# Patient Record
Sex: MALE | Race: WHITE | NOT HISPANIC OR LATINO | Employment: FULL TIME | URBAN - METROPOLITAN AREA
[De-identification: names, ages, dates, MRNs, and addresses within clinical notes are randomized per-mention and may not be internally consistent; named-entity substitution may affect disease eponyms.]

---

## 2023-06-07 ENCOUNTER — APPOINTMENT (EMERGENCY)
Dept: RADIOLOGY | Facility: HOSPITAL | Age: 18
End: 2023-06-07
Payer: COMMERCIAL

## 2023-06-07 ENCOUNTER — HOSPITAL ENCOUNTER (EMERGENCY)
Facility: HOSPITAL | Age: 18
Discharge: HOME | End: 2023-06-07
Attending: EMERGENCY MEDICINE
Payer: COMMERCIAL

## 2023-06-07 VITALS
WEIGHT: 140.65 LBS | TEMPERATURE: 98.9 F | OXYGEN SATURATION: 100 % | BODY MASS INDEX: 20.83 KG/M2 | DIASTOLIC BLOOD PRESSURE: 93 MMHG | HEART RATE: 70 BPM | RESPIRATION RATE: 15 BRPM | SYSTOLIC BLOOD PRESSURE: 116 MMHG | HEIGHT: 69 IN

## 2023-06-07 DIAGNOSIS — R06.00 DYSPNEA: Primary | ICD-10-CM

## 2023-06-07 LAB
ALBUMIN SERPL BCP-MCNC: 4.6 G/DL (ref 3.5–5)
ALP SERPL-CCNC: 82 U/L (ref 34–104)
ALT SERPL W P-5'-P-CCNC: 15 U/L (ref 7–52)
ANION GAP SERPL CALCULATED.3IONS-SCNC: 6 MMOL/L (ref 4–13)
AST SERPL W P-5'-P-CCNC: 23 U/L (ref 13–39)
BASOPHILS # BLD AUTO: 0.08 THOUSANDS/ÂΜL (ref 0–0.1)
BASOPHILS NFR BLD AUTO: 1 % (ref 0–1)
BILIRUB SERPL-MCNC: 1.07 MG/DL (ref 0.2–1)
BUN SERPL-MCNC: 20 MG/DL (ref 5–25)
CALCIUM SERPL-MCNC: 9.4 MG/DL (ref 8.4–10.2)
CARDIAC TROPONIN I PNL SERPL HS: <2 NG/L
CHLORIDE SERPL-SCNC: 103 MMOL/L (ref 96–108)
CO2 SERPL-SCNC: 27 MMOL/L (ref 21–32)
CREAT SERPL-MCNC: 1.31 MG/DL (ref 0.6–1.3)
EOSINOPHIL # BLD AUTO: 0.12 THOUSAND/ÂΜL (ref 0–0.61)
EOSINOPHIL NFR BLD AUTO: 1 % (ref 0–6)
ERYTHROCYTE [DISTWIDTH] IN BLOOD BY AUTOMATED COUNT: 12.3 % (ref 11.6–15.1)
GFR SERPL CREATININE-BSD FRML MDRD: 77 ML/MIN/1.73SQ M
GLUCOSE SERPL-MCNC: 159 MG/DL (ref 65–140)
HCT VFR BLD AUTO: 46.2 % (ref 36.5–49.3)
HGB BLD-MCNC: 15.9 G/DL (ref 12–17)
IMM GRANULOCYTES # BLD AUTO: 0.02 THOUSAND/UL (ref 0–0.2)
IMM GRANULOCYTES NFR BLD AUTO: 0 % (ref 0–2)
LYMPHOCYTES # BLD AUTO: 0.69 THOUSANDS/ÂΜL (ref 0.6–4.47)
LYMPHOCYTES NFR BLD AUTO: 7 % (ref 14–44)
MCH RBC QN AUTO: 28.8 PG (ref 26.8–34.3)
MCHC RBC AUTO-ENTMCNC: 34.4 G/DL (ref 31.4–37.4)
MCV RBC AUTO: 84 FL (ref 82–98)
MONOCYTES # BLD AUTO: 0.49 THOUSAND/ÂΜL (ref 0.17–1.22)
MONOCYTES NFR BLD AUTO: 5 % (ref 4–12)
NEUTROPHILS # BLD AUTO: 7.95 THOUSANDS/ÂΜL (ref 1.85–7.62)
NEUTS SEG NFR BLD AUTO: 86 % (ref 43–75)
NRBC BLD AUTO-RTO: 0 /100 WBCS
PLATELET # BLD AUTO: 273 THOUSANDS/UL (ref 149–390)
PMV BLD AUTO: 10 FL (ref 8.9–12.7)
POTASSIUM SERPL-SCNC: 3.3 MMOL/L (ref 3.5–5.3)
PROT SERPL-MCNC: 7.4 G/DL (ref 6.4–8.4)
RBC # BLD AUTO: 5.53 MILLION/UL (ref 3.88–5.62)
SODIUM SERPL-SCNC: 136 MMOL/L (ref 135–147)
WBC # BLD AUTO: 9.35 THOUSAND/UL (ref 4.31–10.16)

## 2023-06-07 PROCEDURE — 93005 ELECTROCARDIOGRAM TRACING: CPT

## 2023-06-07 PROCEDURE — 71045 X-RAY EXAM CHEST 1 VIEW: CPT

## 2023-06-07 PROCEDURE — 99283 EMERGENCY DEPT VISIT LOW MDM: CPT

## 2023-06-07 PROCEDURE — 85025 COMPLETE CBC W/AUTO DIFF WBC: CPT | Performed by: EMERGENCY MEDICINE

## 2023-06-07 PROCEDURE — 84484 ASSAY OF TROPONIN QUANT: CPT | Performed by: EMERGENCY MEDICINE

## 2023-06-07 PROCEDURE — 36415 COLL VENOUS BLD VENIPUNCTURE: CPT | Performed by: EMERGENCY MEDICINE

## 2023-06-07 PROCEDURE — 99284 EMERGENCY DEPT VISIT MOD MDM: CPT | Performed by: EMERGENCY MEDICINE

## 2023-06-07 PROCEDURE — 80053 COMPREHEN METABOLIC PANEL: CPT | Performed by: EMERGENCY MEDICINE

## 2023-06-07 RX ORDER — POTASSIUM CHLORIDE 20 MEQ/1
20 TABLET, EXTENDED RELEASE ORAL ONCE
Status: COMPLETED | OUTPATIENT
Start: 2023-06-07 | End: 2023-06-07

## 2023-06-07 RX ADMIN — POTASSIUM CHLORIDE 20 MEQ: 1500 TABLET, EXTENDED RELEASE ORAL at 21:06

## 2023-06-07 NOTE — Clinical Note
Erica Szymanski was seen and treated in our emergency department on 6/7/2023  Diagnosis:     Susan Coroks  may return to work on return date  He may return on this date: 06/09/2023         If you have any questions or concerns, please don't hesitate to call        Margot Saavedra MD    ______________________________           _______________          _______________  Hospital Representative                              Date                                Time

## 2023-06-08 LAB
ATRIAL RATE: 93 BPM
P AXIS: 75 DEGREES
PR INTERVAL: 136 MS
QRS AXIS: 104 DEGREES
QRSD INTERVAL: 98 MS
QT INTERVAL: 354 MS
QTC INTERVAL: 421 MS
T WAVE AXIS: 75 DEGREES
VENTRICULAR RATE: 85 BPM

## 2023-06-08 PROCEDURE — 93010 ELECTROCARDIOGRAM REPORT: CPT | Performed by: INTERNAL MEDICINE

## 2023-06-08 NOTE — DISCHARGE INSTRUCTIONS
You were seen in the emergency department today for shortness of breath  This is most likely related to hyperventilation syndrome which can be brought about by stress or anxiety  Screening test done here today did not suggest any problems with your heart, lungs, or biochemistries  Your potassium was somewhat low and we have given you some potassium here

## 2023-07-03 NOTE — ED PROVIDER NOTES
History  Chief Complaint   Patient presents with   • Panic Attack     Pt came by EMS c/o a panic attack that happened while he was stopped at [employer] on his way to work. Pt states his hands cramped up and SOB. Pt denies Hx of this. 1year-old male presenting to the emergency department for evaluation of muscle cramps. Patient called EMS when he got very anxious. Started to hyperventilate, then had cramping of his bilateral hands. States that he has generalized anxiety over his work and health. Symptoms have somewhat resolved but he still mildly anxious. He denies any HI or SI. Has some chest tightness at the time. None       No past medical history on file. No past surgical history on file. No family history on file. I have reviewed and agree with the history as documented. No existing history information found. No existing history information found. Review of Systems   Constitutional: Negative for appetite change, chills, fatigue and fever. HENT: Negative for sneezing and sore throat. Eyes: Negative for visual disturbance. Respiratory: Negative for cough, choking, chest tightness, shortness of breath and wheezing. Cardiovascular: Positive for chest pain. Negative for palpitations. Gastrointestinal: Negative for abdominal pain, constipation, diarrhea, nausea and vomiting. Genitourinary: Negative for difficulty urinating and dysuria. Neurological: Negative for dizziness, weakness, light-headedness, numbness and headaches. Psychiatric/Behavioral: The patient is nervous/anxious. All other systems reviewed and are negative. Physical Exam  Physical Exam  Vitals and nursing note reviewed. Constitutional:       General: He is not in acute distress. Appearance: He is well-developed. He is not diaphoretic. HENT:      Head: Normocephalic and atraumatic. Eyes:      Pupils: Pupils are equal, round, and reactive to light. Neck:      Vascular: No JVD. Trachea: No tracheal deviation. Cardiovascular:      Rate and Rhythm: Normal rate and regular rhythm. Heart sounds: Normal heart sounds. No murmur heard. No friction rub. No gallop. Pulmonary:      Effort: Pulmonary effort is normal. No respiratory distress. Breath sounds: Normal breath sounds. No wheezing or rales. Abdominal:      General: Bowel sounds are normal. There is no distension. Palpations: Abdomen is soft. Tenderness: There is no abdominal tenderness. There is no guarding or rebound. Skin:     General: Skin is warm and dry. Coloration: Skin is not pale. Neurological:      Mental Status: He is alert and oriented to person, place, and time. Cranial Nerves: No cranial nerve deficit. Motor: No abnormal muscle tone.    Psychiatric:         Behavior: Behavior normal.         Vital Signs  ED Triage Vitals [06/07/23 1804]   Temperature Pulse Respirations Blood Pressure SpO2   98.9 °F (37.2 °C) 84 16 116/93 98 %      Temp Source Heart Rate Source Patient Position - Orthostatic VS BP Location FiO2 (%)   Oral Monitor Sitting Right arm --      Pain Score       --           Vitals:    06/07/23 1804 06/07/23 1809 06/07/23 2059   BP: 116/93 116/93    Pulse: 84 91 70   Patient Position - Orthostatic VS: Sitting           Visual Acuity      ED Medications  Medications   potassium chloride (K-DUR,KLOR-CON) CR tablet 20 mEq (20 mEq Oral Given 6/7/23 2106)       Diagnostic Studies  Results Reviewed     Procedure Component Value Units Date/Time    HS Troponin 0hr (reflex protocol) [[id number]]  (Normal) Collected: 06/07/23 1903    Lab Status: Final result Specimen: Blood from Arm, Right Updated: 06/07/23 1951     hs TnI 0hr <2 ng/L     Comprehensive metabolic panel [[id number]]  (Abnormal) Collected: 06/07/23 1903    Lab Status: Final result Specimen: Blood from Arm, Right Updated: 06/07/23 1944     Sodium 136 mmol/L      Potassium 3.3 mmol/L      Chloride 103 mmol/L      CO2 27 mmol/L      ANION GAP 6 mmol/L      BUN 20 mg/dL      Creatinine 1.31 mg/dL      Glucose 159 mg/dL      Calcium 9.4 mg/dL      AST 23 U/L      ALT 15 U/L      Alkaline Phosphatase 82 U/L      Total Protein 7.4 g/dL      Albumin 4.6 g/dL      Total Bilirubin 1.07 mg/dL      eGFR 77 ml/min/1.73sq m     Narrative:      [facility] guidelines for Chronic Kidney Disease (CKD):   •  Stage 1 with normal or high GFR (GFR > 90 mL/min/1.73 square meters)  •  Stage 2 Mild CKD (GFR = 60-89 mL/min/1.73 square meters)  •  Stage 3A Moderate CKD (GFR = 45-59 mL/min/1.73 square meters)  •  Stage 3B Moderate CKD (GFR = 30-44 mL/min/1.73 square meters)  •  Stage 4 Severe CKD (GFR = 15-29 mL/min/1.73 square meters)  •  Stage 5 End Stage CKD (GFR <15 mL/min/1.73 square meters)  Note: GFR calculation is accurate only with a steady state creatinine    CBC and differential [[id number]]  (Abnormal) Collected: 06/07/23 1903    Lab Status: Final result Specimen: Blood from Arm, Right Updated: 06/07/23 1910     WBC 9.35 Thousand/uL      RBC 5.53 Million/uL      Hemoglobin 15.9 g/dL      Hematocrit 46.2 %      MCV 84 fL      MCH 28.8 pg      MCHC 34.4 g/dL      RDW 12.3 %      MPV 10.0 fL      Platelets 727 Thousands/uL      nRBC 0 /100 WBCs      Neutrophils Relative 86 %      Immat GRANS % 0 %      Lymphocytes Relative 7 %      Monocytes Relative 5 %      Eosinophils Relative 1 %      Basophils Relative 1 %      Neutrophils Absolute 7.95 Thousands/µL      Immature Grans Absolute 0.02 Thousand/uL      Lymphocytes Absolute 0.69 Thousands/µL      Monocytes Absolute 0.49 Thousand/µL      Eosinophils Absolute 0.12 Thousand/µL      Basophils Absolute 0.08 Thousands/µL                  XR chest 1 view portable   Final Result by Jeremie Camejo MD (06/08 0564)      No acute cardiopulmonary disease.                   Workstation performed: [id number]                    Procedures  Procedures         ED Course         CRAFFT Flowsheet Row Most Recent Value   MUNA Initial Screen: During the past 12 months, did you:    1. Drink any alcohol (more than a few sips)? No Filed at: 06/07/2023 1806   2. Smoke any marijuana or hashish No Filed at: 06/07/2023 1806   3. Use anything else to get high? ("anything else" includes illegal drugs, over the counter and prescription drugs, and things that you sniff or 'saha')? No Filed at: 06/07/2023 1806                                          Medical Decision Making  42-year-old male with chest pain and shortness of breath, most likely related to panic attack/anxiety, the events described seems like carpopedal spasm secondary to hyperventilation syndrome. We will check cardiac labs, also offered patient outpatient mental health resources. Amount and/or Complexity of Data Reviewed  Labs: ordered. Radiology: ordered. Risk  Prescription drug management. Disposition  Final diagnoses:   Dyspnea     Time reflects when diagnosis was documented in both MDM as applicable and the Disposition within this note     Time User Action Codes Description Comment    6/7/2023  8:51 PM Diana Davis Add [F41.1] Anxiety state     6/7/2023  8:51 PM Luis Enrique Spain Remove [F41.1] Anxiety state     6/7/2023  8:51 PM Vanita Spain Add [R06.00] Dyspnea       ED Disposition     ED Disposition   Discharge    Condition   Stable    Date/Time   Wed Jun 7, 2023  8:51 PM    [address] discharge to home/self care. Follow-up Information    None         There are no discharge medications for this patient. No discharge procedures on file.     PDMP Review     None          ED Provider  Electronically Signed by           Diana Davis MD  07/02/23 5261